# Patient Record
Sex: FEMALE | Race: WHITE | NOT HISPANIC OR LATINO | URBAN - METROPOLITAN AREA
[De-identification: names, ages, dates, MRNs, and addresses within clinical notes are randomized per-mention and may not be internally consistent; named-entity substitution may affect disease eponyms.]

---

## 2019-05-22 ENCOUNTER — EMERGENCY (EMERGENCY)
Facility: HOSPITAL | Age: 24
LOS: 1 days | Discharge: HOME | End: 2019-05-22
Attending: EMERGENCY MEDICINE
Payer: COMMERCIAL

## 2019-05-22 VITALS
DIASTOLIC BLOOD PRESSURE: 90 MMHG | OXYGEN SATURATION: 100 % | TEMPERATURE: 97 F | SYSTOLIC BLOOD PRESSURE: 128 MMHG | HEART RATE: 80 BPM | RESPIRATION RATE: 18 BRPM

## 2019-05-22 VITALS — HEIGHT: 69 IN | WEIGHT: 154.98 LBS

## 2019-05-22 PROCEDURE — 73610 X-RAY EXAM OF ANKLE: CPT | Mod: 26,RT

## 2019-05-22 PROCEDURE — 99283 EMERGENCY DEPT VISIT LOW MDM: CPT | Mod: 25

## 2019-05-22 PROCEDURE — 29515 APPLICATION SHORT LEG SPLINT: CPT | Mod: RT

## 2019-05-22 PROCEDURE — 73610 X-RAY EXAM OF ANKLE: CPT

## 2019-05-22 PROCEDURE — 29515 APPLICATION SHORT LEG SPLINT: CPT

## 2019-05-22 RX ORDER — IBUPROFEN 200 MG
600 TABLET ORAL ONCE
Refills: 0 | Status: COMPLETED | OUTPATIENT
Start: 2019-05-22 | End: 2019-05-22

## 2019-05-22 RX ADMIN — Medication 600 MILLIGRAM(S): at 20:28

## 2019-05-22 NOTE — ED ADULT NURSE NOTE - NSIMPLEMENTINTERV_GEN_ALL_ED
Implemented All Fall Risk Interventions:  [place] to call system. Call bell, personal items and telephone within reach. Instruct patient to call for assistance. Room bathroom lighting operational. Non-slip footwear when patient is off stretcher. Physically safe environment: no spills, clutter or unnecessary equipment. Stretcher in lowest position, wheels locked, appropriate side rails in place. Provide visual cue, wrist band, yellow gown, etc. Monitor gait and stability. Monitor for mental status changes and reorient to person, place, and time. Review medications for side effects contributing to fall risk. Reinforce activity limits and safety measures with patient and family.

## 2019-05-22 NOTE — ED PROVIDER NOTE - MUSCULOSKELETAL, MLM
Moving all extremities spontaneously.  R ankle with mild soft tissue swelling, ecchymosis over R lateral malleolus, decreased ROM secondary to pain, neurovascularly intact, distal pulses intact, capillary refill intact, skin intact.

## 2019-05-22 NOTE — ED ADULT TRIAGE NOTE - CHIEF COMPLAINT QUOTE
Pt A&Ox4 states "I was playing volleyball and landed wrong, not sure if I heard a snap."  BIBA from [facility] c/o right ankle pain and swelling unable to bear weight, ice and splinted. Patient awake and alert, calm complains of 4/10 pain, sensory intact, able to move toes, positive distal pulses.

## 2019-05-22 NOTE — ED ADULT NURSE NOTE - CHIEF COMPLAINT QUOTE
Pt A&Ox4 states "I was playing volleyball and landed wrong, not sure if I heard a snap."  BIBA from [facility] c/o right ankle pain and swelling unable to bear weight, ice and splinted. Patient awake and alert, calm complains of 4/10 pain, sensory intact, able to move toes, positive distal pulses.

## 2019-05-22 NOTE — ED PROVIDER NOTE - OBJECTIVE STATEMENT
22 y/o F pt presents to the ED BIBA c/o sudden onset ankle pain approx 30 minutes PTA.  Pt was playing beach volleyball when she felt her R ankle give out, with sudden onset pain, swelling, and bruising to her lateral R ankle.  Pt reports worsening pain with ambulation since the injury.  EMS applied ice shortly after the injury occurred.  Denies .  No further acute complaints at this time. 24 y/o F pt presents to the ED BIBA c/o sudden onset ankle pain approx 30 minutes PTA.  Pt was playing beach volleyball when she felt her R ankle give out, with sudden onset pain, swelling, and bruising to her lateral R ankle.  Pt reports worsening pain with ambulation since the injury.  EMS applied ice shortly after the injury occurred.  Denies N/V, numbness.  No further acute complaints at this time.

## 2019-05-22 NOTE — ED ADULT NURSE NOTE - OBJECTIVE STATEMENT
pt alert and oriented x4 came in c/o sudden onset right ankle pain, pt states she was playing volleyball and rolled right ankle. pt ankle noted to be swollen and echymosis. respirations even unlabored. pt educated on plan of care, pt able to successfully teach back plan of care to RN, RN will continue to reeducate pt during hospital stay.

## 2025-03-11 NOTE — ED PROVIDER NOTE - CHIEF COMPLAINT
Hello,    An order was received for a Colonoscopy Screening. This appears to be a recall, last performed 8/8/2008 by Dr. Zimmerman with 5 year recall. Will route to Recall Nurse to review.      Thank you,      GI Pre-Admit / OAC Chart Review Dept.  [facility]        
The patient is a 23y Female complaining of injury, ankle.